# Patient Record
(demographics unavailable — no encounter records)

---

## 2024-10-17 NOTE — REVIEW OF SYSTEMS
[Back Pain] : back pain [Neck Pain] : neck pain [Muscle Pain] : muscle pain [Radiating Pain] : radiating pain [Joint Pain] : joint pain [Negative] : Heme/Lymph

## 2024-10-21 NOTE — CONSULT LETTER
[Dear  ___] : Dear  [unfilled], [Consult Letter:] : I had the pleasure of evaluating your patient, [unfilled]. [Please see my note below.] : Please see my note below. [Consult Closing:] : Thank you very much for allowing me to participate in the care of this patient.  If you have any questions, please do not hesitate to contact me. [Sincerely,] : Sincerely, [FreeTextEntry3] : Cedrick King MD

## 2024-10-21 NOTE — DATA REVIEWED
[FreeTextEntry1] : Exam: MRI LUMBAR SPINE  There is minimal dextroscoliosis of the lumbar spine. There is normal curvature to the lumbar lordosis. The vertebral bodies are normal height and configuration. The intervertebral disc spaces are within normal limits. The conus terminates at the L1 level and demonstrates no evidence of abnormal signal changes.  Evaluation of the individual levels demonstrates at the L5/S1 level there is a very minimal disc bulge contacting the ventral thecal sac. The bilateral neuroforamen are mildly narrowed. The bilateral L5 foraminal exiting nerve roots are within normal limits. There is mild degenerative changes of the facets. There is no evidence of spinal canal stenosis.  At the L4/L5 level there is a very minimal diffuse disc bulge contacting the ventral thecal sac. There is mild bilateral foraminal narrowing. The bilateral L4 foraminal exiting nerve roots are within normal limits. There is mild facet and ligamentous hypertrophy. There is no evidence of spinal canal stenosis.  At the L3/L4 level there is a very minimal minimal disc bulge flattening the ventral thecal sac. There is moderate bilateral foraminal narrowing. The bilateral L3 foraminal exiting nerve roots are within normal limits. There is mild facet and ligamentous hypertrophy. There is a tiny amount of fluid seen in the facet joints. There is no evidence of spinal canal stenosis.  At the L1/L2 and L2/L3 levels there are no evidence of focal disc herniation or spinal canal stenosis. The bilateral neuroforamen are patent.  There are multiple nonobstructing stones seen within the gallbladder. There is an approximately 1.7 cm cystic lesion in the midpole of the right kidney. If there is further clinical concern, ultrasound may be obtained.   Impression:  Minimal dextroscoliosis. L5/S1, L4/L5 and L3/L4 very minimal disc bulges with moderate bilateral foraminal narrowing at L3/L4. No evidence of spinal canal stenosis.

## 2024-10-21 NOTE — PHYSICAL EXAM
[de-identified] : Constitutional: Well-developed, in no acute distress  Musculoskeletal: Cervical Spine:    Gait: Antalgic Inspection: Normal curvature, no abnormal kyphosis or scoliosis  Facet loading: pain bilaterally  Palpation: Cervical paraspinal muscles: pain bilaterally 		 Muscle Strength: Deltoid: 5/5 bilaterally Biceps: 5/5 bilaterally Triceps: 5/5 bilaterally Adductor pollicis: 5/5 bilaterally  Sensation: normal and equal in bilateral upper extremities  Extremity: no edema noted Neurological: Memory normal, AAO x 3, Cranial nerves II - XII grossly normal Psychiatric: Appropriate mood and affect, oriented to time, place, person, and situation
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
## 2024-10-21 NOTE — PHYSICAL EXAM
[de-identified] : Constitutional: Well-developed, in no acute distress  Musculoskeletal: Cervical Spine:    Gait: Antalgic Inspection: Normal curvature, no abnormal kyphosis or scoliosis  Facet loading: pain bilaterally  Palpation: Cervical paraspinal muscles: pain bilaterally 		 Muscle Strength: Deltoid: 5/5 bilaterally Biceps: 5/5 bilaterally Triceps: 5/5 bilaterally Adductor pollicis: 5/5 bilaterally  Sensation: normal and equal in bilateral upper extremities  Extremity: no edema noted Neurological: Memory normal, AAO x 3, Cranial nerves II - XII grossly normal Psychiatric: Appropriate mood and affect, oriented to time, place, person, and situation

## 2024-10-21 NOTE — ASSESSMENT
[FreeTextEntry1] : 43 yof w/ severe neck, back, and right-sided pain.  I have personally reviewed the patient's MRI in detail and discussed my personal interpretation of the study which is significant for moderate foraminal narrowing in the lumbar spine.  X-ray cervical spine.  The patient has failed to have relief with over six weeks of physical therapy within the last three months and all medications. Given their failure to improve with all other conservative measures recommend MRI cervical spine. Patient will return to review imaging and plan for potential intervention.  Physical therapy prescribed - goal will be to increase ROM, strengthening, postural training, other modalities ad obey which may include massage and stim. Goals of therapy discussed with the patient in detail and will be discussed with physical therapist. Patient will follow-up following course of physical therapy to monitor progress and adjust therapy as needed.  Acetaminophen 1,000 mg q8h prn for moderate pain. Risks, benefits, and alternatives of acetaminophen discussed with patient.  Ibuprofen 600 mg q8h prn add when pain is not adequately controlled with acetaminophen. Risks, benefits, and alternatives of ibuprofen discussed with patient.  Diet and nutritional strategies discussed which may improve patients pain and will improve overall health.

## 2024-11-13 NOTE — PHYSICAL EXAM
[de-identified] : Constitutional: Well-developed, in no acute distress  Psychiatric: Appropriate mood and affect, oriented to time, place, person, and situation
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
## 2024-11-13 NOTE — ASSESSMENT
[FreeTextEntry1] : 43 yof w/ severe neck, back, and right-sided pain here to review MRI.  I have personally reviewed the patient's MRI in detail and discussed my personal interpretation of the study which is significant for moderate foraminal narrowing in the lumbar spine.  I have personally reviewed the patient's MRI in detail and discussed my personal interpretation of the study which is significant for a right-sided disc herniation at C7-T1.  The patient has failed to have relief with medication management and physical therapy. Given the patients failure to improve with all other conservative measures, recommend C7-T1 interlaminar epidural steroid injection under fluoroscopic guidance. The patient will follow-up with me in my office two weeks following intervention.  I have discussed in detail with the patient that an interventional spine procedure is associated with potential risks. The procedure may include an injection of steroid and potentially other medications (local anesthetic and normal saline) into the epidural space or surrounding tissue of the spine. There are significant risks of this procedure which include and are not limited to infection, bleeding, worsening pain, dural puncture leading to post-dural puncture headache, nerve damage, spinal cord injury, paralysis, stroke, and death. There is a chance that the procedure does not improve their pain. There are risks associated with the steroid being absorbed into the body systemically. These include dysphoria, difficulty sleeping, mood swings, and personality changes. Pre-menopausal women may notice a regularity his in her menstrual cycle for 2-3 months following the injection. Steroids can specifically affect patients with hypertension, diabetes, and peptic ulcers. The procedure may cause a temporary increase in blood pressure and blood glucose, and may adversely affect a peptic ulcer. Other, more rare complications, including avascular necrosis of the joints, glaucoma, and osteoporosis. I have discussed the risks of the procedure at length with the patient, and the potential benefits of pain relief. I have offered alternatives to the procedure. All questions were answered. The patient expressed understanding and wishes to proceed with the procedure.  Physical therapy prescribed - goal will be to increase ROM, strengthening, postural training, other modalities ad obey which may include massage and stim. Goals of therapy discussed with the patient in detail and will be discussed with physical therapist. Patient will follow-up following course of physical therapy to monitor progress and adjust therapy as needed.  Acetaminophen 1,000 mg q8h prn for moderate pain. Risks, benefits, and alternatives of acetaminophen discussed with patient.  Ibuprofen 600 mg q8h prn add when pain is not adequately controlled with acetaminophen. Risks, benefits, and alternatives of ibuprofen discussed with patient.  Diet and nutritional strategies discussed which may improve patients pain and will improve overall health.  I explained to patient benefits and limitation of TeleMedicine visits. Patient understands that limitations include inability to perform comprehensive physical exam, which may lead to potential diagnostic inconsistencies.  Patient understands that diagnosis and treatment may be limited by these inconsistencies and patient agrees to proceed with care plan.  Patient is scheduled for procedure based on history, imaging and limited physical exam performed on TeleHealth visit.  If necessary, additional focal physical exam will be performed on date of procedure

## 2024-11-13 NOTE — DATA REVIEWED
[FreeTextEntry1] : Exam Date:      11/05/24 Exam:         MRI CERVICAL SPINE   FINDINGS: The MRI exam demonstrates loss of the normal cervical lordosis which may be secondary to positioning. Reviewed by heights are maintained. There is mild disc space narrowing at C5-6.. There are edematous endplate degenerative changes at C5-C6.. No abnormal signal is identified within the cervical cord. The prevertebral soft tissues are within normal limits. The cerebellar tonsils are normal in position.   At the C2/3 level, there is no disc herniation. There is no central spinal canal stenosis or neural foramen narrowing.   At the C3/4 level, there is no disc herniation. There is no central spinal canal stenosis or neural foramen narrowing.   At the C4/5 level, there is a disc bulge without significant spinal canal stenosis or neural foraminal narrowing..   At the C5/6 level, there is a disc bulge resulting in mild spinal canal stenosis which has improved from prior exam. There is no significant neural foraminal narrowing.   At the C6/7 level, there is no disc herniation. There is no central spinal canal stenosis or neural foramen narrowing.   At the C7/T1 level, there is a right subarticular disc herniation with mild lateral recess narrowing stable from prior exam..   IMPRESSION:   C5-6 disc bulge with mild spinal canal stenosis which has improved from prior MRI 6/30/2022.   C7-T1 right subarticular disc herniation with mild lateral recess narrowing which is stable from prior exam.  Exam: MRI LUMBAR SPINE  There is minimal dextroscoliosis of the lumbar spine. There is normal curvature to the lumbar lordosis. The vertebral bodies are normal height and configuration. The intervertebral disc spaces are within normal limits. The conus terminates at the L1 level and demonstrates no evidence of abnormal signal changes.  Evaluation of the individual levels demonstrates at the L5/S1 level there is a very minimal disc bulge contacting the ventral thecal sac. The bilateral neuroforamen are mildly narrowed. The bilateral L5 foraminal exiting nerve roots are within normal limits. There is mild degenerative changes of the facets. There is no evidence of spinal canal stenosis.  At the L4/L5 level there is a very minimal diffuse disc bulge contacting the ventral thecal sac. There is mild bilateral foraminal narrowing. The bilateral L4 foraminal exiting nerve roots are within normal limits. There is mild facet and ligamentous hypertrophy. There is no evidence of spinal canal stenosis.  At the L3/L4 level there is a very minimal minimal disc bulge flattening the ventral thecal sac. There is moderate bilateral foraminal narrowing. The bilateral L3 foraminal exiting nerve roots are within normal limits. There is mild facet and ligamentous hypertrophy. There is a tiny amount of fluid seen in the facet joints. There is no evidence of spinal canal stenosis.  At the L1/L2 and L2/L3 levels there are no evidence of focal disc herniation or spinal canal stenosis. The bilateral neuroforamen are patent.  There are multiple nonobstructing stones seen within the gallbladder. There is an approximately 1.7 cm cystic lesion in the midpole of the right kidney. If there is further clinical concern, ultrasound may be obtained.   Impression:  Minimal dextroscoliosis. L5/S1, L4/L5 and L3/L4 very minimal disc bulges with moderate bilateral foraminal narrowing at L3/L4. No evidence of spinal canal stenosis.
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
## 2024-12-11 NOTE — ASSESSMENT
[FreeTextEntry1] : 43 yof w/ severe neck, back, and right-sided pain here to review MRI. Has new left-sided shoulder pain.  New left shoulder pain recommend to continue conservative care. If pain worsens will get MRI.   I have personally reviewed the patient's MRI in detail and discussed my personal interpretation of the study which is significant for moderate foraminal narrowing in the lumbar spine.  I have personally reviewed the patient's MRI in detail and discussed my personal interpretation of the study which is significant for a right-sided disc herniation at C7-T1.  The patient has failed to have relief with medication management and physical therapy. Given the patients failure to improve with all other conservative measures, recommend C7-T1 interlaminar epidural steroid injection under fluoroscopic guidance. The patient will follow-up with me in my office two weeks following intervention.  I have discussed in detail with the patient that an interventional spine procedure is associated with potential risks. The procedure may include an injection of steroid and potentially other medications (local anesthetic and normal saline) into the epidural space or surrounding tissue of the spine. There are significant risks of this procedure which include and are not limited to infection, bleeding, worsening pain, dural puncture leading to post-dural puncture headache, nerve damage, spinal cord injury, paralysis, stroke, and death. There is a chance that the procedure does not improve their pain. There are risks associated with the steroid being absorbed into the body systemically. These include dysphoria, difficulty sleeping, mood swings, and personality changes. Pre-menopausal women may notice a regularity his in her menstrual cycle for 2-3 months following the injection. Steroids can specifically affect patients with hypertension, diabetes, and peptic ulcers. The procedure may cause a temporary increase in blood pressure and blood glucose, and may adversely affect a peptic ulcer. Other, more rare complications, including avascular necrosis of the joints, glaucoma, and osteoporosis. I have discussed the risks of the procedure at length with the patient, and the potential benefits of pain relief. I have offered alternatives to the procedure. All questions were answered. The patient expressed understanding and wishes to proceed with the procedure.  Physical therapy prescribed - goal will be to increase ROM, strengthening, postural training, other modalities ad obey which may include massage and stim. Goals of therapy discussed with the patient in detail and will be discussed with physical therapist. Patient will follow-up following course of physical therapy to monitor progress and adjust therapy as needed.  Acetaminophen 1,000 mg q8h prn for moderate pain. Risks, benefits, and alternatives of acetaminophen discussed with patient.  Ibuprofen 600 mg q8h prn add when pain is not adequately controlled with acetaminophen. Risks, benefits, and alternatives of ibuprofen discussed with patient.  Cyclobenzaprine 10 mg QHS.  Diet and nutritional strategies discussed which may improve patients pain and will improve overall health.  I explained to patient benefits and limitation of TeleMedicine visits. Patient understands that limitations include inability to perform comprehensive physical exam, which may lead to potential diagnostic inconsistencies.  Patient understands that diagnosis and treatment may be limited by these inconsistencies and patient agrees to proceed with care plan.  Patient is scheduled for procedure based on history, imaging and limited physical exam performed on TeleHealth visit.  If necessary, additional focal physical exam will be performed on date of procedure

## 2024-12-11 NOTE — PHYSICAL EXAM
[de-identified] : Constitutional: Well-developed, in no acute distress  Psychiatric: Appropriate mood and affect, oriented to time, place, person, and situation
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
## 2025-01-13 NOTE — ASSESSMENT
[FreeTextEntry1] : 43 yof w/ severe neck pain improved after CANDI but with severe left shoulder pain.  The patient has failed to have relief with over six weeks of physical therapy within the last three months and all medications. Given their failure to improve with all other conservative measures recommend MRI left shoulder. Patient will return to review imaging and plan for potential intervention.  I have personally reviewed the patient's MRI in detail and discussed my personal interpretation of the study which is significant for moderate foraminal narrowing in the lumbar spine.  I have personally reviewed the patient's MRI in detail and discussed my personal interpretation of the study which is significant for a right-sided disc herniation at C7-T1.  Back pain at baseline.  Physical therapy prescribed - goal will be to increase ROM, strengthening, postural training, other modalities ad obey which may include massage and stim. Goals of therapy discussed with the patient in detail and will be discussed with physical therapist. Patient will follow-up following course of physical therapy to monitor progress and adjust therapy as needed.  Acetaminophen 1,000 mg q8h prn for moderate pain. Risks, benefits, and alternatives of acetaminophen discussed with patient.  Ibuprofen 600 mg q8h prn add when pain is not adequately controlled with acetaminophen. Risks, benefits, and alternatives of ibuprofen discussed with patient.  Cyclobenzaprine 10 mg QHS.  Diet and nutritional strategies discussed which may improve patients pain and will improve overall health.  Based on the current evaluation and management, I will provide ongoing, longitudinal care for the complex painful condition described above. Patient is encouraged to reach out with any questions and/or concerns regarding their condition and care.

## 2025-01-13 NOTE — PHYSICAL EXAM
[de-identified] : Constitutional: Well-developed, in no acute distress  Psychiatric: Appropriate mood and affect, oriented to time, place, person, and situation

## 2025-02-05 NOTE — ASSESSMENT
[FreeTextEntry1] : 44 year old female here for recent fall with injury to right knee and lower leg. Significant ecchymosis and edema of right inferior knee and anterior shin. Ordered xrays of right knee, tib/fib to r/o fracture. Advised use of ace bandage daily, acetaminophen/NSAIDs as needed, leg elevation, ice therapy. Works in [employer] with constant walking while on shift. Provided work letter for temporary light duty.  Return precautions given, low suspicion for DVT at this time.

## 2025-02-05 NOTE — ASSESSMENT
[FreeTextEntry1] : 44 year old female here for recent fall with injury to right knee and lower leg. Significant ecchymosis and edema of right inferior knee and anterior shin. Ordered xrays of right knee, tib/fib to r/o fracture. Advised use of ace bandage daily, acetaminophen/NSAIDs as needed, leg elevation, ice therapy. Works in [employer] with constant walking while on shift. Provided work letter for temporary light duty.  Return precautions given, low suspicion for DVT at this time.
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
## 2025-02-05 NOTE — PHYSICAL EXAM
[No Respiratory Distress] : no respiratory distress  [No Accessory Muscle Use] : no accessory muscle use [Normal Rate] : normal rate  [Alert and Oriented x3] : oriented to person, place, and time [Normal] : affect was normal and insight and judgment were intact [de-identified] : right leg with ecchymosis of inferior aspect knee and anterior pretibial region, no calf tenderness of erythema

## 2025-02-05 NOTE — PHYSICAL EXAM
[No Respiratory Distress] : no respiratory distress  [No Accessory Muscle Use] : no accessory muscle use [Normal Rate] : normal rate  [Alert and Oriented x3] : oriented to person, place, and time [Normal] : affect was normal and insight and judgment were intact [de-identified] : right leg with ecchymosis of inferior aspect knee and anterior pretibial region, no calf tenderness of erythema

## 2025-02-20 NOTE — PHYSICAL EXAM
[Chaperone Present] : A chaperone was present in the examining room during all aspects of the physical examination [19854] : A chaperone was present during the pelvic exam. [Appropriately responsive] : appropriately responsive [Alert] : alert [No Acute Distress] : no acute distress [No Lymphadenopathy] : no lymphadenopathy [Regular Rate Rhythm] : regular rate rhythm [No Murmurs] : no murmurs [Clear to Auscultation B/L] : clear to auscultation bilaterally [Soft] : soft [Non-tender] : non-tender [Non-distended] : non-distended [No HSM] : No HSM [No Lesions] : no lesions [No Mass] : no mass [Oriented x3] : oriented x3 [Examination Of The Breasts] : a normal appearance [No Masses] : no breast masses were palpable [Labia Majora] : normal [Labia Minora] : normal [IUD String] : an IUD string was noted [Normal] : normal [Normal Position] : in a normal position [Uterine Adnexae] : normal [FreeTextEntry2] : MICHELLE Clarke [Tenderness] : nontender [FreeTextEntry5] : string 5cm long, trim to 2.5 cm at patients request

## 2025-02-20 NOTE — HISTORY OF PRESENT ILLNESS
[FreeTextEntry1] : 43 yo here for well woman exam. Has had Mirena IUD placed in 2024. Has light periods since Mirena and sometimes amenorrhea. HAs increased vaginal odor without discharge or irritation. Strings bother her.  GynHX: denies fibroids, ovarian cyst. Hx of colposcopy x2, cryo therapy? > last colposcopy 6/21/4 negative for dysplasia.  Pap: 2/6/24: NILM/ HPV neg   Mammogram: 2/5/25: BIRADS I  BD scan: 5/6/2021 wnl

## 2025-02-20 NOTE — PHYSICAL EXAM
[Chaperone Present] : A chaperone was present in the examining room during all aspects of the physical examination [80263] : A chaperone was present during the pelvic exam. [Appropriately responsive] : appropriately responsive [Alert] : alert [No Acute Distress] : no acute distress [No Lymphadenopathy] : no lymphadenopathy [Regular Rate Rhythm] : regular rate rhythm [No Murmurs] : no murmurs [Clear to Auscultation B/L] : clear to auscultation bilaterally [Soft] : soft [Non-tender] : non-tender [Non-distended] : non-distended [No HSM] : No HSM [No Lesions] : no lesions [No Mass] : no mass [Oriented x3] : oriented x3 [Examination Of The Breasts] : a normal appearance [No Masses] : no breast masses were palpable [Labia Majora] : normal [Labia Minora] : normal [IUD String] : an IUD string was noted [Normal] : normal [Normal Position] : in a normal position [Uterine Adnexae] : normal [FreeTextEntry2] : MICHELLE Clarke [Tenderness] : nontender [FreeTextEntry5] : string 5cm long, trim to 2.5 cm at patients request

## 2025-02-20 NOTE — HISTORY OF PRESENT ILLNESS
[FreeTextEntry1] : 45 yo here for well woman exam. Has had Mirena IUD placed in 2024. Has light periods since Mirena and sometimes amenorrhea. HAs increased vaginal odor without discharge or irritation. Strings bother her.  GynHX: denies fibroids, ovarian cyst. Hx of colposcopy x2, cryo therapy? > last colposcopy 6/21/4 negative for dysplasia.  Pap: 2/6/24: NILM/ HPV neg   Mammogram: 2/5/25: BIRADS I  BD scan: 5/6/2021 wnl

## 2025-02-20 NOTE — COUNSELING
[Nutrition/ Exercise/ Weight Management] : nutrition, exercise, weight management [Vitamins/Supplements] : vitamins/supplements [Sunscreen] : sunscreen [Drugs] : drugs [Breast Self Exam] : breast self exam [Contraception/ Emergency Contraception/ Safe Sexual Practices] : contraception, emergency contraception, safe sexual practices [Vaccines] : vaccines [Influenza Vaccine] : influenza vaccine

## 2025-05-29 NOTE — PHYSICAL EXAM
[No Lymphadenopathy] : no lymphadenopathy [Soft] : soft [Non-tender] : non-tender [No HSM] : No HSM [No Mass] : no mass [MA] : MA [No Lesions] : no lesions  [Labia Majora] : normal [Labia Minora] : normal [Normal] : normal [Normal Position] : in a normal position [Uterine Adnexae] : normal [FreeTextEntry2] : Charlee [FreeTextEntry3] : No thyroid nodules [Tenderness] : nontender [Enlarged ___ wks] : not enlarged [FreeTextEntry1] : No edema, erythema; normal pigmentation [FreeTextEntry4] : not much d/c but malodorous. Swab done for pathogens

## 2025-05-29 NOTE — HISTORY OF PRESENT ILLNESS
[TextBox_4] :  (2002 NVD at 36 wk; male) LMP 25 with Mirena IUD since .  Periods light to absent with the Mirena.  2025 BV swab showed the presence of Gardnerella vaginalis. Presents for eval of vulvar discomfort / irritation over past wk - subsequent to having intercourse w/ her partner of ~1 1/2 yrs (since late ) - monogamous. No d/c, odor or lesions. Did not self-treat. Used some scented baby wipes. Uses Dove Sensitive Skin soap. Cotton underwear. [Mammogramdate] : 2/5/2025 [TextBox_19] : Mitali lifetime risk: 9.5%.  Scattered fibroglandular density.  KARAN: Grade 0.  No evidence of malignancy [BreastSonogramDate] : 2/5/2025 [TextBox_25] : Negative [PapSmeardate] : 2/6/2024 [TextBox_31] : NILM; HPV neg
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
## 2025-06-03 NOTE — PHYSICAL EXAM
[Chaperoned Physical Exam] : A chaperone was present in the examining room during all aspects of the physical examination. [Appropriately responsive] : appropriately responsive [Oriented x3] : oriented x3 [Discharge] : a  ~M vaginal discharge was present [Moderate] : moderate [White] : white [Thick] : thick [Normal] : normal [IUD String] : an IUD string was noted [Foul Smelling] : not foul smelling [FreeTextEntry1] : erythema on external vulva [FreeTextEntry2] : opened vesicle lesions on labia minora/majora,

## 2025-06-03 NOTE — HISTORY OF PRESENT ILLNESS
[FreeTextEntry1] : 45 yo here for vaginitis. Saw a lesion on vagina 4 days ago after having sex with boy friend. Had eval of 5/29 no findigns noted. Aftward noted lesiosn. Denie shx of outbreak on genitals or moth.  Hx of trichomonas 3-4 years ago.    woman exam. Has had Mirena IUD placed in 2024. Has light periods since Mirena and sometimes amenorrhea. HAs increased vaginal odor without discharge or irritation. Strings bother her.  GynHX: denies fibroids, ovarian cyst. Hx of colposcopy x2, cryo therapy? > last colposcopy 6/21/4 negative for dysplasia.  Pap: 2/6/24: NILM/ HPV neg   Mammogram: 2/5/25: BIRADS I  BD scan: 5/6/2021 wnl

## 2025-06-09 NOTE — REVIEW OF SYSTEMS
[Back Pain] : back pain [Neck Pain] : neck pain [Radiating Pain] : radiating pain [Muscle Pain] : muscle pain [Joint Pain] : joint pain [Negative] : Heme/Lymph
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
## 2025-06-09 NOTE — PHYSICAL EXAM
[de-identified] : Constitutional: Well-developed, in no acute distress  Musculoskeletal: Cervical Spine:    Gait: Antalgic Inspection: Normal curvature, no abnormal kyphosis or scoliosis  Facet loading: pain bilaterally  Palpation: Cervical paraspinal muscles: pain bilaterally 		 Muscle Strength: Deltoid: 5/5 bilaterally Biceps: 5/5 bilaterally Triceps: 5/5 bilaterally Adductor pollicis: 5/5 bilaterally  Sensation: normal and equal in bilateral upper extremities  Extremity: no edema noted Neurological: Memory normal, AAO x 3, Cranial nerves II - XII grossly normal Psychiatric: Appropriate mood and affect, oriented to time, place, person, and situation

## 2025-06-09 NOTE — DATA REVIEWED
[FreeTextEntry1] : Exam: MRI SHOULDER LT  ROTATOR CUFF: Mild supraspinatus and infraspinatus tendinosis with minimal undersurface fraying at their insertions without a high-grade partial or full-thickness tear. BICEPS TENDON: The long head of the biceps tendon is present within the bicipital groove, courses normally within the joint and inserts on the superior labrum/glenoid. AC JOINT: Mild to moderate AC joint arthrosis with mild subacromial subdeltoid bursitis. Os acromiale with degeneration of the synchondrosis GLENOID LABRUM AND GLENOHUMERAL LIGAMENTS: Minimal undersurface fraying of the superior labrum. GLENOHUMERAL CARTILAGE AND SUBCHONDRAL BONE: The hyaline cartilage is intact. There is no subchondral edema. SYNOVIUM/JOINT FLUID: Small joint effusion. Mild subacromial subdeltoid bursitis. BONE MARROW: Mild subchondral edema the AC joint. Subchondral cystic change of the interspinous insertion. MUSCLES: No rotator cuff muscle atrophy or edema. NEUROVASCULAR STRUCTURES: Normal SUBCUTANEOUS SOFT TISSUES: Normal   IMPRESSION:  Mild supraspinatus and infraspinatus tendinosis with mild undersurface fraying at their insertions.  Mild to moderate AC joint arthrosis with a mildly degenerated os acromiale and associated subacromial subdeltoid bursitis.   Exam Date:      11/05/24 Exam:         MRI CERVICAL SPINE   FINDINGS: The MRI exam demonstrates loss of the normal cervical lordosis which may be secondary to positioning. Reviewed by heights are maintained. There is mild disc space narrowing at C5-6.. There are edematous endplate degenerative changes at C5-C6.. No abnormal signal is identified within the cervical cord. The prevertebral soft tissues are within normal limits. The cerebellar tonsils are normal in position.   At the C2/3 level, there is no disc herniation. There is no central spinal canal stenosis or neural foramen narrowing.   At the C3/4 level, there is no disc herniation. There is no central spinal canal stenosis or neural foramen narrowing.   At the C4/5 level, there is a disc bulge without significant spinal canal stenosis or neural foraminal narrowing..   At the C5/6 level, there is a disc bulge resulting in mild spinal canal stenosis which has improved from prior exam. There is no significant neural foraminal narrowing.   At the C6/7 level, there is no disc herniation. There is no central spinal canal stenosis or neural foramen narrowing.   At the C7/T1 level, there is a right subarticular disc herniation with mild lateral recess narrowing stable from prior exam..   IMPRESSION:   C5-6 disc bulge with mild spinal canal stenosis which has improved from prior MRI 6/30/2022.   C7-T1 right subarticular disc herniation with mild lateral recess narrowing which is stable from prior exam.  Exam: MRI LUMBAR SPINE  There is minimal dextroscoliosis of the lumbar spine. There is normal curvature to the lumbar lordosis. The vertebral bodies are normal height and configuration. The intervertebral disc spaces are within normal limits. The conus terminates at the L1 level and demonstrates no evidence of abnormal signal changes.  Evaluation of the individual levels demonstrates at the L5/S1 level there is a very minimal disc bulge contacting the ventral thecal sac. The bilateral neuroforamen are mildly narrowed. The bilateral L5 foraminal exiting nerve roots are within normal limits. There is mild degenerative changes of the facets. There is no evidence of spinal canal stenosis.  At the L4/L5 level there is a very minimal diffuse disc bulge contacting the ventral thecal sac. There is mild bilateral foraminal narrowing. The bilateral L4 foraminal exiting nerve roots are within normal limits. There is mild facet and ligamentous hypertrophy. There is no evidence of spinal canal stenosis.  At the L3/L4 level there is a very minimal minimal disc bulge flattening the ventral thecal sac. There is moderate bilateral foraminal narrowing. The bilateral L3 foraminal exiting nerve roots are within normal limits. There is mild facet and ligamentous hypertrophy. There is a tiny amount of fluid seen in the facet joints. There is no evidence of spinal canal stenosis.  At the L1/L2 and L2/L3 levels there are no evidence of focal disc herniation or spinal canal stenosis. The bilateral neuroforamen are patent.  There are multiple nonobstructing stones seen within the gallbladder. There is an approximately 1.7 cm cystic lesion in the midpole of the right kidney. If there is further clinical concern, ultrasound may be obtained.   Impression:  Minimal dextroscoliosis. L5/S1, L4/L5 and L3/L4 very minimal disc bulges with moderate bilateral foraminal narrowing at L3/L4. No evidence of spinal canal stenosis.

## 2025-06-09 NOTE — ASSESSMENT
[FreeTextEntry1] : 44 yof w/ severe neck pain improved after CANDI but with severe left shoulder pain, here to review MRI.   I have personally reviewed the patient's MRI in detail and discussed my personal interpretation of the study which is significant for rotator cuff pathology.  I have personally reviewed the patient's MRI in detail and discussed my personal interpretation of the study which is significant for moderate foraminal narrowing in the lumbar spine.  I have personally reviewed the patient's MRI in detail and discussed my personal interpretation of the study which is significant for a right-sided disc herniation at C7-T1.  Back pain at baseline.  Physical therapy prescribed - goal will be to increase ROM, strengthening, postural training, other modalities ad obey which may include massage and stim. Goals of therapy discussed with the patient in detail and will be discussed with physical therapist. Patient will follow-up following course of physical therapy to monitor progress and adjust therapy as needed.  Acetaminophen 1,000 mg q8h prn for moderate pain. Risks, benefits, and alternatives of acetaminophen discussed with patient.  Ibuprofen 600 mg q8h prn add when pain is not adequately controlled with acetaminophen. Risks, benefits, and alternatives of ibuprofen discussed with patient.  Cyclobenzaprine 10 mg QHS.  Diet and nutritional strategies discussed which may improve patients pain and will improve overall health.  Based on the current evaluation and management, I will provide ongoing, longitudinal care for the complex painful condition described above. Patient is encouraged to reach out with any questions and/or concerns regarding their condition and care.

## 2025-06-23 NOTE — HISTORY OF PRESENT ILLNESS
[de-identified] : Very pleasant 44-year-old right-hand-dominant female comes in with left shoulder discomfort.  This has been going on for some time.  Last November it started.  She seen one of my colleagues for her neck and right sided pain which has gotten better with injection.  Currently main complaint is pain and discomfort over the left shoulder region as well as down her arm.

## 2025-06-23 NOTE — HISTORY OF PRESENT ILLNESS
[de-identified] : Very pleasant 44-year-old right-hand-dominant female comes in with left shoulder discomfort.  This has been going on for some time.  Last November it started.  She seen one of my colleagues for her neck and right sided pain which has gotten better with injection.  Currently main complaint is pain and discomfort over the left shoulder region as well as down her arm.

## 2025-06-23 NOTE — PHYSICAL EXAM
[de-identified] : Well appearing person in no acute distress. Appears their stated age. Alert and oriented x3. Normal mood and affect. They have good range of motion of their shoulder today with the exception that they are lacking about 10 degrees of forward flexion and abduction. They have tenderness over their biceps. Positive [place]ie's test. Positive throwing test. Positive Speed's test. Positive Neri test. They have some weakness on strength testing on their supraspinatus with discomfort on testing of their supraspinatus. They have no tenderness over their AC joint. No pain with range of motion of their neck or elbow. Neurovascularly intact in their upper extremity. [ ]  [de-identified] : An MRI of her left shoulder was reviewed with the patient brought with her.  This demonstrates partial-thickness tearing of the rotator cuff.  There is an os acromiale.  She does have inflammation of the biceps tendon and a small superior labral tear as well

## 2025-06-23 NOTE — ASSESSMENT
[FreeTextEntry1] : Left shoulder biceps tendinitis with impingement and partial-thickness rotator cuff tear

## 2025-06-23 NOTE — PHYSICAL EXAM
[de-identified] : Well appearing person in no acute distress. Appears their stated age. Alert and oriented x3. Normal mood and affect. They have good range of motion of their shoulder today with the exception that they are lacking about 10 degrees of forward flexion and abduction. They have tenderness over their biceps. Positive Bulloch's test. Positive throwing test. Positive Speed's test. Positive Neri test. They have some weakness on strength testing on their supraspinatus with discomfort on testing of their supraspinatus. They have no tenderness over their AC joint. No pain with range of motion of their neck or elbow. Neurovascularly intact in their upper extremity. [ ]  [de-identified] : An MRI of her left shoulder was reviewed with the patient brought with her.  This demonstrates partial-thickness tearing of the rotator cuff.  There is an os acromiale.  She does have inflammation of the biceps tendon and a small superior labral tear as well

## 2025-06-23 NOTE — DISCUSSION/SUMMARY
Tinnitus (Ringing in the Ears)     Treatment may include maskers and hearing aids.     Tinnitus is the term for a noise in your ear not caused by an outside sound. The noise might be a ringing, clicking, hiss, or roar. It can vary in pitch and may be soft or quite loud. For some people, tinnitus is a minor nuisance. But for others, the noise can make it hard to hear, work, and even sleep. When tinnitus can't be cured, a number of treatments may offer relief.  What causes tinnitus?  Loud noises, hearing loss, and ear wax can cause tinnitus. So can certain medicines. Large amounts of aspirin or caffeine are sometimes to blame. In many cases, the exact cause of tinnitus is unknown.  How is tinnitus treated?  Identifying and removing the cause is the best way to treat tinnitus. For that reason, your healthcare provider may refer you to an otolaryngologist (ear, nose, and throat doctor). Your hearing may also be checked by an audiologist (hearing specialist). If you have hearing loss, wearing a hearing aid may help your tinnitus. When the cause can't be found, the tinnitus itself may be treated. Some of the treatments are listed below, and your healthcare provider can tell you more about them:  · Maskers are small devices that look like hearing aids. They emit a pleasant sound that helps cover up the ringing in your ears. Hearing aids and maskers are sometimes used together.  · Medicines that treat anxiety and depression may ease tinnitus in some people.  · Hypnosis or relaxation therapy may help head noise seem less severe.  · Tinnitus retraining therapy combines counseling and maskers. Both can help take your mind off the tinnitus.  For more information  · American Speech-Hearing-Language Association [phone] www.wicho.org  · American Tinnitus Association [phone] www.tammy.org  · National [place] on Deafness and other Communication Disorders 624-435-5861 www.nidcd.nih.gov   Date Last Reviewed: 7/1/2016  © 
0530-2080 [employer]. [address] [place] [address], [place] [address]. All rights reserved. This information is not intended as a substitute for professional medical care. Always follow your healthcare professional's instructions.        
[de-identified] : I had a long discussion with the patient.  We discussed operative and nonoperative treatment options.  Right now she would like to proceed with a one-time steroid injection followed by structured physical therapy.  Will see how she does over the next several weeks.  She continues to have pain and discomfort she may be a candidate for left shoulder arthroscopy with biceps tenodesis, subacromial decompression, acromioplasty and possible rotator cuff repair but will see how she does from the injection first as well as the physical therapy.  We also discussed use of anti-inflammatory medications as well as the risks and benefits.  All of her questions were answered, she agrees with the plan
[de-identified] : I had a long discussion with the patient.  We discussed operative and nonoperative treatment options.  Right now she would like to proceed with a one-time steroid injection followed by structured physical therapy.  Will see how she does over the next several weeks.  She continues to have pain and discomfort she may be a candidate for left shoulder arthroscopy with biceps tenodesis, subacromial decompression, acromioplasty and possible rotator cuff repair but will see how she does from the injection first as well as the physical therapy.  We also discussed use of anti-inflammatory medications as well as the risks and benefits.  All of her questions were answered, she agrees with the plan
gold